# Patient Record
Sex: MALE | Race: WHITE | URBAN - METROPOLITAN AREA
[De-identification: names, ages, dates, MRNs, and addresses within clinical notes are randomized per-mention and may not be internally consistent; named-entity substitution may affect disease eponyms.]

---

## 2023-09-25 ENCOUNTER — OFFICE VISIT (OUTPATIENT)
Age: 33
End: 2023-09-25
Payer: COMMERCIAL

## 2023-09-25 VITALS
HEART RATE: 96 BPM | DIASTOLIC BLOOD PRESSURE: 87 MMHG | HEIGHT: 72 IN | BODY MASS INDEX: 31.86 KG/M2 | SYSTOLIC BLOOD PRESSURE: 137 MMHG | WEIGHT: 235.2 LBS

## 2023-09-25 DIAGNOSIS — E11.65 TYPE 2 DIABETES MELLITUS WITH HYPERGLYCEMIA, WITH LONG-TERM CURRENT USE OF INSULIN (HCC): Primary | ICD-10-CM

## 2023-09-25 DIAGNOSIS — E66.9 OBESITY (BMI 30-39.9): ICD-10-CM

## 2023-09-25 DIAGNOSIS — Z79.4 TYPE 2 DIABETES MELLITUS WITH HYPERGLYCEMIA, WITH LONG-TERM CURRENT USE OF INSULIN (HCC): Primary | ICD-10-CM

## 2023-09-25 PROCEDURE — 99204 OFFICE O/P NEW MOD 45 MIN: CPT | Performed by: GENERAL ACUTE CARE HOSPITAL

## 2023-09-25 RX ORDER — PRAVASTATIN SODIUM 40 MG
40 TABLET ORAL DAILY
COMMUNITY

## 2023-09-25 RX ORDER — METOPROLOL SUCCINATE 100 MG/1
TABLET, EXTENDED RELEASE ORAL
COMMUNITY
Start: 2023-09-19

## 2023-09-25 RX ORDER — INSULIN GLARGINE 100 [IU]/ML
50 INJECTION, SOLUTION SUBCUTANEOUS NIGHTLY
Qty: 45 ML | Refills: 5 | Status: SHIPPED | OUTPATIENT
Start: 2023-09-25

## 2023-09-25 RX ORDER — INSULIN LISPRO 100 [IU]/ML
INJECTION, SOLUTION INTRAVENOUS; SUBCUTANEOUS
Qty: 45 ML | Refills: 5 | Status: SHIPPED | OUTPATIENT
Start: 2023-09-25

## 2023-09-25 RX ORDER — INSULIN GLARGINE 100 [IU]/ML
44 INJECTION, SOLUTION SUBCUTANEOUS NIGHTLY
COMMUNITY
Start: 2023-09-04 | End: 2023-09-25 | Stop reason: SDUPTHER

## 2023-09-25 RX ORDER — ASPIRIN 81 MG/1
81 TABLET, CHEWABLE ORAL DAILY
COMMUNITY

## 2023-09-25 RX ORDER — ACETAMINOPHEN 160 MG
TABLET,DISINTEGRATING ORAL
COMMUNITY

## 2023-09-25 NOTE — PATIENT INSTRUCTIONS
a safe target range. Once your blood sugar is in a safe range, eat a snack or meal to prevent recurrent low blood sugar. Make sure family, friends, and coworkers know the symptoms of low blood sugar and know how to get your sugar level up. If you were prescribed glucagon, always have it with you. Make sure friends and family know how to use it. When should you call for help? Call 911 anytime you think you may need emergency care. For example, call if:    You passed out (lost consciousness). You are confused or cannot think clearly. Your blood sugar is very high or very low. Watch closely for changes in your health, and be sure to contact your doctor if:    Your blood sugar stays outside the level your doctor set for you. You have any problems.

## 2023-09-25 NOTE — PROGRESS NOTES
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      Presents for : Diabetes Mellitus 2        ASSESSMENT AND PLAN:     Type 2 diabetes Mellitus, uncontrolled   Discussed the details of diabetes mellitus including pathophysiology and diabetes care and importance of achieving target blood sugar numbers for a goal a1c of less than 7%    Medications:  Metformin 1000 mg twice daily   Lantus 50 units at bedtime  Lispro take 14 units + correction scale (take 15 minutes before each meal)  Correction Scale:  1 unit for every 25 above 150    Check blood sugar 2 times per day  Referred for DM education   Discussed hypoglycemia management  Annual Ophthalm, Regular foot self checks and regular dental care  Discussed important lifestyle aspects and importance of staying active    Mixed Hyperlipidemia  Currently taking pravastatin - 40 MG   No results found for: \"CHOL\", \"CHOLPOCT\", \"CHOLX\", \"CHLST\", \"CHOLV\", \"TOTCHOLEXT\", \"HDL\", \"HDLPOC\", \"HDLEXT\", \"HDLC\", \"LDL\", \"LDLCEXT\", \"LDLC\", \"VLDLC\", \"VLDL\", \"TGLX\", \"TRIGL\", \"TRIGLYCEXT\"      Blood pressure is well controlled today, cont current meds  On Aspirin: yes    Obesity  Discussed important lifestyle modifications, will benefit from weight loss    -------------------------------------------------------------------------------------------------    Was following with Dr Lieutenant Cox but due to insurance change had to switch    Diabetes History:  Diabetes was diagnosed: 15 years ago  Family History of diabetes is paternal GF DM2  Last A1c: 12.3% 9/25/23      Diabetes-related Hospitalizations:denies   Hx of DKA: denies     Complications:  MI or CVA:denies   PVD: denies   Retinopathy:unknown   Last Ophthalm:2 years ago  Nephropathy:denies   Neuropathy:denies     Last Podiatry:not following   Gastropathy: denies   Amputations: denies     Current Home Regimen:  Lantus 44 units at bedtime   Humalog sliding

## 2023-09-26 PROBLEM — E11.65 TYPE 2 DIABETES MELLITUS WITH HYPERGLYCEMIA, WITH LONG-TERM CURRENT USE OF INSULIN (HCC): Status: ACTIVE | Noted: 2023-09-26

## 2023-09-26 PROBLEM — Z79.4 TYPE 2 DIABETES MELLITUS WITH HYPERGLYCEMIA, WITH LONG-TERM CURRENT USE OF INSULIN (HCC): Status: ACTIVE | Noted: 2023-09-26

## 2023-11-03 ENCOUNTER — TELEPHONE (OUTPATIENT)
Age: 33
End: 2023-11-03

## 2023-11-03 NOTE — TELEPHONE ENCOUNTER
Mr Elisabeth Iyer needs clearance for oral surgery with Dr Nico Becerra, however due to his last hemoglobin A1c on 09/25/2023 12.3% I cannot yet clear him for elective surgery til his hemoglobin A1c is less than 8%.

## 2023-11-03 NOTE — TELEPHONE ENCOUNTER
Carmen  ( Surgical asst) was notified of the message noted per Dr. Marsha Redding regarding oral surgery for Mr. Erika Patricia and voiced okay and understanding.

## 2023-12-25 DIAGNOSIS — Z79.4 TYPE 2 DIABETES MELLITUS WITH HYPERGLYCEMIA, WITH LONG-TERM CURRENT USE OF INSULIN (HCC): ICD-10-CM

## 2023-12-25 DIAGNOSIS — E11.65 TYPE 2 DIABETES MELLITUS WITH HYPERGLYCEMIA, WITH LONG-TERM CURRENT USE OF INSULIN (HCC): ICD-10-CM

## 2024-01-29 ENCOUNTER — TELEPHONE (OUTPATIENT)
Age: 34
End: 2024-01-29

## 2024-02-19 RX ORDER — INSULIN GLARGINE 100 [IU]/ML
50 INJECTION, SOLUTION SUBCUTANEOUS NIGHTLY
Qty: 45 ML | Refills: 5 | Status: SHIPPED | OUTPATIENT
Start: 2024-02-19

## 2024-02-19 RX ORDER — INSULIN LISPRO 100 [IU]/ML
INJECTION, SOLUTION INTRAVENOUS; SUBCUTANEOUS
Qty: 45 ML | Refills: 5 | Status: SHIPPED | OUTPATIENT
Start: 2024-02-19

## 2024-12-09 RX ORDER — PEN NEEDLE, DIABETIC 32GX 5/32"
NEEDLE, DISPOSABLE MISCELLANEOUS
Qty: 100 EACH | Refills: 5 | Status: SHIPPED | OUTPATIENT
Start: 2024-12-09

## 2025-03-19 RX ORDER — INSULIN GLARGINE 100 [IU]/ML
INJECTION, SOLUTION SUBCUTANEOUS
Qty: 15 ML | Refills: 11 | Status: SHIPPED | OUTPATIENT
Start: 2025-03-19

## 2025-03-19 RX ORDER — INSULIN LISPRO 100 [IU]/ML
INJECTION, SOLUTION INTRAVENOUS; SUBCUTANEOUS
Qty: 15 ML | Refills: 11 | Status: SHIPPED | OUTPATIENT
Start: 2025-03-19

## 2025-07-12 NOTE — TELEPHONE ENCOUNTER
1/29/2024  2:52 PM    Mrs. Connolly called and stated she had to reschedule Mr. Connolly appt due to other health issues and MRI. Mrs. Connolly would like to know if Dr. Covington can refill the pt's short and long active insulin and if she would like for him to still get his labs done.     Pt is ok with his spouse speaking on his behalf.     Mrs. Connolly can be reached at [phone].    Thank you,   Taryn Garcia    
Sent letter  
glen all pertinent systems normal